# Patient Record
Sex: FEMALE | Race: WHITE | NOT HISPANIC OR LATINO
[De-identification: names, ages, dates, MRNs, and addresses within clinical notes are randomized per-mention and may not be internally consistent; named-entity substitution may affect disease eponyms.]

---

## 2017-09-19 ENCOUNTER — CHARTING TRANS (OUTPATIENT)
Dept: OTHER | Age: 37
End: 2017-09-19

## 2017-09-28 ENCOUNTER — MYAURORA ACCOUNT LINK (OUTPATIENT)
Dept: OTHER | Age: 37
End: 2017-09-28

## 2017-09-28 ENCOUNTER — CHARTING TRANS (OUTPATIENT)
Dept: OBGYN | Age: 37
End: 2017-09-28

## 2018-03-23 ENCOUNTER — CHARTING TRANS (OUTPATIENT)
Dept: OTHER | Age: 38
End: 2018-03-23

## 2018-11-28 VITALS
BODY MASS INDEX: 39.86 KG/M2 | HEIGHT: 66 IN | DIASTOLIC BLOOD PRESSURE: 82 MMHG | SYSTOLIC BLOOD PRESSURE: 122 MMHG | WEIGHT: 248 LBS

## 2018-12-04 ENCOUNTER — TELEPHONE (OUTPATIENT)
Dept: OBGYN | Age: 38
End: 2018-12-04

## 2018-12-04 RX ORDER — DESOGESTREL AND ETHINYL ESTRADIOL 0.15-0.03
1 KIT ORAL DAILY
Qty: 84 TABLET | Refills: 0 | Status: SHIPPED | OUTPATIENT
Start: 2018-12-04 | End: 2020-02-28 | Stop reason: SDUPTHER

## 2019-01-02 ENCOUNTER — OFFICE VISIT (OUTPATIENT)
Dept: OBGYN | Age: 39
End: 2019-01-02

## 2019-01-02 VITALS
SYSTOLIC BLOOD PRESSURE: 124 MMHG | DIASTOLIC BLOOD PRESSURE: 84 MMHG | BODY MASS INDEX: 40.34 KG/M2 | HEIGHT: 66 IN | WEIGHT: 251 LBS

## 2019-01-02 DIAGNOSIS — Z01.419 GYNECOLOGIC EXAM NORMAL: Primary | ICD-10-CM

## 2019-01-02 DIAGNOSIS — Z11.51 SPECIAL SCREENING EXAMINATION FOR HUMAN PAPILLOMAVIRUS (HPV): ICD-10-CM

## 2019-01-02 DIAGNOSIS — Z01.419 WELL WOMAN EXAM: ICD-10-CM

## 2019-01-02 DIAGNOSIS — Z01.419 WELL WOMAN EXAM WITH ROUTINE GYNECOLOGICAL EXAM: ICD-10-CM

## 2019-01-02 PROCEDURE — 88142 CYTOPATH C/V THIN LAYER: CPT | Performed by: OBSTETRICS & GYNECOLOGY

## 2019-01-02 PROCEDURE — 99395 PREV VISIT EST AGE 18-39: CPT | Performed by: OBSTETRICS & GYNECOLOGY

## 2019-01-02 RX ORDER — DESOGESTREL AND ETHINYL ESTRADIOL 0.15-0.03
1 KIT ORAL DAILY
Qty: 90 TABLET | Refills: 5 | Status: SHIPPED | OUTPATIENT
Start: 2019-01-02 | End: 2020-02-28 | Stop reason: SDUPTHER

## 2019-01-10 LAB — AP REPORT: NORMAL

## 2019-01-15 LAB — HPV I/H RISK 4 DNA CVX QL NAA+PROBE: NORMAL

## 2019-02-06 ENCOUNTER — E-ADVICE (OUTPATIENT)
Dept: OBGYN | Age: 39
End: 2019-02-06

## 2019-02-06 DIAGNOSIS — Z01.419 WELL WOMAN EXAM: Primary | ICD-10-CM

## 2019-03-02 ENCOUNTER — LAB SERVICES (OUTPATIENT)
Dept: LAB | Age: 39
End: 2019-03-02

## 2019-03-02 DIAGNOSIS — Z01.419 WELL WOMAN EXAM: ICD-10-CM

## 2019-03-02 LAB
CHOLEST SERPL-MCNC: 153 MG/DL (ref 140–200)
GLUCOSE P FAST SERPL-MCNC: 100 MG/DL (ref 60–100)
HDLC SERPL-MCNC: 91 MG/DL
LDLC SERPL CALC-MCNC: 43 MG/DL (ref 30–100)
TRIGL SERPL-MCNC: 97 MG/DL (ref 0–200)
TSH SERPL DL<=0.05 MIU/L-ACNC: 0.76 M[IU]/L (ref 0.3–4.82)

## 2019-03-02 PROCEDURE — 84443 ASSAY THYROID STIM HORMONE: CPT | Performed by: OBSTETRICS & GYNECOLOGY

## 2019-03-02 PROCEDURE — 82947 ASSAY GLUCOSE BLOOD QUANT: CPT | Performed by: OBSTETRICS & GYNECOLOGY

## 2019-03-02 PROCEDURE — 80061 LIPID PANEL: CPT | Performed by: OBSTETRICS & GYNECOLOGY

## 2019-03-02 PROCEDURE — 36415 COLL VENOUS BLD VENIPUNCTURE: CPT | Performed by: OBSTETRICS & GYNECOLOGY

## 2020-02-28 RX ORDER — DESOGESTREL AND ETHINYL ESTRADIOL 0.15-0.03
1 KIT ORAL DAILY
Qty: 84 TABLET | Refills: 0 | Status: SHIPPED | OUTPATIENT
Start: 2020-02-28 | End: 2020-05-13 | Stop reason: SDUPTHER

## 2020-03-18 ENCOUNTER — APPOINTMENT (OUTPATIENT)
Dept: OBGYN | Age: 40
End: 2020-03-18

## 2020-04-22 ENCOUNTER — APPOINTMENT (OUTPATIENT)
Dept: OBGYN | Age: 40
End: 2020-04-22

## 2020-05-13 RX ORDER — DESOGESTREL AND ETHINYL ESTRADIOL 0.15-0.03
1 KIT ORAL DAILY
Qty: 84 TABLET | Refills: 0 | Status: SHIPPED | OUTPATIENT
Start: 2020-05-13 | End: 2020-07-01 | Stop reason: SDUPTHER

## 2020-05-20 ENCOUNTER — APPOINTMENT (OUTPATIENT)
Dept: OBGYN | Age: 40
End: 2020-05-20

## 2020-07-01 ENCOUNTER — OFFICE VISIT (OUTPATIENT)
Dept: OBGYN | Age: 40
End: 2020-07-01

## 2020-07-01 VITALS
HEART RATE: 88 BPM | WEIGHT: 245 LBS | HEIGHT: 66 IN | SYSTOLIC BLOOD PRESSURE: 140 MMHG | RESPIRATION RATE: 16 BRPM | TEMPERATURE: 97.4 F | DIASTOLIC BLOOD PRESSURE: 90 MMHG | BODY MASS INDEX: 39.37 KG/M2

## 2020-07-01 DIAGNOSIS — Z01.419 GYNECOLOGIC EXAM NORMAL: Primary | ICD-10-CM

## 2020-07-01 PROCEDURE — 99395 PREV VISIT EST AGE 18-39: CPT | Performed by: OBSTETRICS & GYNECOLOGY

## 2020-07-01 RX ORDER — DESOGESTREL AND ETHINYL ESTRADIOL 0.15-0.03
1 KIT ORAL DAILY
Qty: 84 TABLET | Refills: 5 | Status: SHIPPED | OUTPATIENT
Start: 2020-07-01

## 2020-09-08 ENCOUNTER — OFFICE VISIT (OUTPATIENT)
Dept: FAMILY MEDICINE CLINIC | Facility: CLINIC | Age: 40
End: 2020-09-08
Payer: COMMERCIAL

## 2020-09-08 ENCOUNTER — LABORATORY ENCOUNTER (OUTPATIENT)
Dept: LAB | Age: 40
End: 2020-09-08
Attending: INTERNAL MEDICINE
Payer: COMMERCIAL

## 2020-09-08 ENCOUNTER — HOSPITAL ENCOUNTER (OUTPATIENT)
Dept: GENERAL RADIOLOGY | Age: 40
Discharge: HOME | End: 2020-09-08
Attending: INTERNAL MEDICINE
Payer: COMMERCIAL

## 2020-09-08 VITALS
OXYGEN SATURATION: 98 % | BODY MASS INDEX: 39.37 KG/M2 | RESPIRATION RATE: 16 BRPM | TEMPERATURE: 98 F | DIASTOLIC BLOOD PRESSURE: 74 MMHG | SYSTOLIC BLOOD PRESSURE: 120 MMHG | HEART RATE: 108 BPM | HEIGHT: 66 IN | WEIGHT: 245 LBS

## 2020-09-08 DIAGNOSIS — R00.2 PALPITATIONS: ICD-10-CM

## 2020-09-08 DIAGNOSIS — R00.2 PALPITATIONS: Primary | ICD-10-CM

## 2020-09-08 LAB
ALBUMIN SERPL-MCNC: 3.7 G/DL (ref 3.4–5)
ALBUMIN/GLOB SERPL: 0.9 {RATIO} (ref 1–2)
ALP LIVER SERPL-CCNC: 89 U/L (ref 37–98)
ALT SERPL-CCNC: 25 U/L (ref 13–56)
ANION GAP SERPL CALC-SCNC: 7 MMOL/L (ref 0–18)
AST SERPL-CCNC: 15 U/L (ref 15–37)
BASOPHILS # BLD AUTO: 0.06 X10(3) UL (ref 0–0.2)
BASOPHILS NFR BLD AUTO: 0.8 %
BILIRUB SERPL-MCNC: 0.3 MG/DL (ref 0.1–2)
BUN BLD-MCNC: 12 MG/DL (ref 7–18)
BUN/CREAT SERPL: 13.5 (ref 10–20)
CALCIUM BLD-MCNC: 9.6 MG/DL (ref 8.5–10.1)
CHLORIDE SERPL-SCNC: 106 MMOL/L (ref 98–112)
CO2 SERPL-SCNC: 25 MMOL/L (ref 21–32)
CREAT BLD-MCNC: 0.89 MG/DL (ref 0.55–1.02)
DEPRECATED RDW RBC AUTO: 42.1 FL (ref 35.1–46.3)
EOSINOPHIL # BLD AUTO: 0.22 X10(3) UL (ref 0–0.7)
EOSINOPHIL NFR BLD AUTO: 3 %
ERYTHROCYTE [DISTWIDTH] IN BLOOD BY AUTOMATED COUNT: 13.5 % (ref 11–15)
GLOBULIN PLAS-MCNC: 4 G/DL (ref 2.8–4.4)
GLUCOSE BLD-MCNC: 91 MG/DL (ref 70–99)
HCT VFR BLD AUTO: 46.7 % (ref 35–48)
HGB BLD-MCNC: 15.4 G/DL (ref 12–16)
IMM GRANULOCYTES # BLD AUTO: 0.01 X10(3) UL (ref 0–1)
IMM GRANULOCYTES NFR BLD: 0.1 %
LYMPHOCYTES # BLD AUTO: 2.71 X10(3) UL (ref 1–4)
LYMPHOCYTES NFR BLD AUTO: 36.7 %
M PROTEIN MFR SERPL ELPH: 7.7 G/DL (ref 6.4–8.2)
MCH RBC QN AUTO: 28.3 PG (ref 26–34)
MCHC RBC AUTO-ENTMCNC: 33 G/DL (ref 31–37)
MCV RBC AUTO: 85.7 FL (ref 80–100)
MONOCYTES # BLD AUTO: 0.51 X10(3) UL (ref 0.1–1)
MONOCYTES NFR BLD AUTO: 6.9 %
NEUTROPHILS # BLD AUTO: 3.87 X10 (3) UL (ref 1.5–7.7)
NEUTROPHILS # BLD AUTO: 3.87 X10(3) UL (ref 1.5–7.7)
NEUTROPHILS NFR BLD AUTO: 52.5 %
OSMOLALITY SERPL CALC.SUM OF ELEC: 285 MOSM/KG (ref 275–295)
PATIENT FASTING Y/N/NP: NO
PLATELET # BLD AUTO: 300 10(3)UL (ref 150–450)
POTASSIUM SERPL-SCNC: 3.9 MMOL/L (ref 3.5–5.1)
RBC # BLD AUTO: 5.45 X10(6)UL (ref 3.8–5.3)
SODIUM SERPL-SCNC: 138 MMOL/L (ref 136–145)
T4 FREE SERPL-MCNC: 1.1 NG/DL (ref 0.8–1.7)
TSI SER-ACNC: 0.82 MIU/ML (ref 0.36–3.74)
WBC # BLD AUTO: 7.4 X10(3) UL (ref 4–11)

## 2020-09-08 PROCEDURE — 36415 COLL VENOUS BLD VENIPUNCTURE: CPT

## 2020-09-08 PROCEDURE — 84439 ASSAY OF FREE THYROXINE: CPT

## 2020-09-08 PROCEDURE — 93227 XTRNL ECG REC<48 HR R&I: CPT | Performed by: INTERNAL MEDICINE

## 2020-09-08 PROCEDURE — 93225 XTRNL ECG REC<48 HRS REC: CPT | Performed by: INTERNAL MEDICINE

## 2020-09-08 PROCEDURE — 3008F BODY MASS INDEX DOCD: CPT | Performed by: INTERNAL MEDICINE

## 2020-09-08 PROCEDURE — 85025 COMPLETE CBC W/AUTO DIFF WBC: CPT

## 2020-09-08 PROCEDURE — 3074F SYST BP LT 130 MM HG: CPT | Performed by: INTERNAL MEDICINE

## 2020-09-08 PROCEDURE — 3078F DIAST BP <80 MM HG: CPT | Performed by: INTERNAL MEDICINE

## 2020-09-08 PROCEDURE — 80053 COMPREHEN METABOLIC PANEL: CPT

## 2020-09-08 PROCEDURE — 93000 ELECTROCARDIOGRAM COMPLETE: CPT | Performed by: INTERNAL MEDICINE

## 2020-09-08 PROCEDURE — 83036 HEMOGLOBIN GLYCOSYLATED A1C: CPT

## 2020-09-08 PROCEDURE — 84443 ASSAY THYROID STIM HORMONE: CPT

## 2020-09-08 PROCEDURE — 99203 OFFICE O/P NEW LOW 30 MIN: CPT | Performed by: INTERNAL MEDICINE

## 2020-09-08 RX ORDER — DESOGESTREL AND ETHINYL ESTRADIOL 0.15-0.03
1 KIT ORAL DAILY
COMMUNITY
Start: 2020-07-01 | End: 2021-08-07

## 2020-09-08 NOTE — PROGRESS NOTES
Lara Rivas is a 45year old female. HPI:   Pt had a palpitation last week in the middle of the night. It lasted 3 hours then better. She sometimes has difficulty swallowing.   She works at [employer] (from home now) and has not seen a doctor in [address] [place] [address] [E]      Meds & Refills for this Visit:  Requested Prescriptions      No prescriptions requested or ordered in this encounter       Imaging & Consults:  CARD MONITOR HOLTER 48 HOUR (CPT=93225)  EKG 12-LEAD    Follow up as needed.      The patient indicates

## 2020-09-09 LAB
EST. AVERAGE GLUCOSE BLD GHB EST-MCNC: 91 MG/DL (ref 68–126)
HBA1C MFR BLD HPLC: 4.8 % (ref ?–5.7)

## 2020-09-17 ENCOUNTER — PATIENT MESSAGE (OUTPATIENT)
Dept: FAMILY MEDICINE CLINIC | Facility: CLINIC | Age: 40
End: 2020-09-17

## 2020-09-17 NOTE — TELEPHONE ENCOUNTER
From: Ian Mi  To: Kenneth Jo MD  Sent: 9/17/2020 8:51 AM CDT  Subject: Non-Urgent Medical Question    I have a question about CARDIAC HOLTER MONITOR 50 HOUR resulted on 9/16/20, 4:19 PM.    So that is good news.  Do I need to come back in for a

## 2021-07-16 ENCOUNTER — TELEPHONE (OUTPATIENT)
Dept: FAMILY MEDICINE CLINIC | Facility: CLINIC | Age: 41
End: 2021-07-16

## 2021-07-16 NOTE — TELEPHONE ENCOUNTER
Spoke with patient. Patient states that she has not had a period since stopping the pill. She is unsure of when her last period was. She believes it was probably about 6 months ago. She states she is not sexually active so she cannot be pregnant.   Traci

## 2021-08-05 ENCOUNTER — OFFICE VISIT (OUTPATIENT)
Dept: FAMILY MEDICINE CLINIC | Facility: CLINIC | Age: 41
End: 2021-08-05
Payer: COMMERCIAL

## 2021-08-05 ENCOUNTER — EXTERNAL RECORD (OUTPATIENT)
Dept: HEALTH INFORMATION MANAGEMENT | Facility: OTHER | Age: 41
End: 2021-08-05

## 2021-08-05 VITALS
BODY MASS INDEX: 39.74 KG/M2 | SYSTOLIC BLOOD PRESSURE: 124 MMHG | WEIGHT: 247.25 LBS | HEART RATE: 87 BPM | HEIGHT: 66 IN | OXYGEN SATURATION: 99 % | RESPIRATION RATE: 18 BRPM | TEMPERATURE: 97 F | DIASTOLIC BLOOD PRESSURE: 70 MMHG

## 2021-08-05 DIAGNOSIS — Z00.00 WELL ADULT EXAM: ICD-10-CM

## 2021-08-05 DIAGNOSIS — N91.2 AMENORRHEA: Primary | ICD-10-CM

## 2021-08-05 PROCEDURE — 3078F DIAST BP <80 MM HG: CPT | Performed by: INTERNAL MEDICINE

## 2021-08-05 PROCEDURE — 3008F BODY MASS INDEX DOCD: CPT | Performed by: INTERNAL MEDICINE

## 2021-08-05 PROCEDURE — 99396 PREV VISIT EST AGE 40-64: CPT | Performed by: INTERNAL MEDICINE

## 2021-08-05 PROCEDURE — 3074F SYST BP LT 130 MM HG: CPT | Performed by: INTERNAL MEDICINE

## 2021-08-05 PROCEDURE — 88175 CYTOPATH C/V AUTO FLUID REDO: CPT | Performed by: INTERNAL MEDICINE

## 2021-08-08 NOTE — PROGRESS NOTES
HPI:   Luana White is a 37year old female who presents for a complete physical exam. Symptoms: denies discharge, itching, burning or dysuria, periods have stopped since she stopped taking OCP. Andi Cunha  Patient complains of inability to lose weight, likes doi heartburn  : denies dysuria, vaginal discharge or itching,periods regular   MUSCULOSKELETAL: denies back pain  NEURO: denies headaches  PSYCHE: denies depression or anxiety  HEMATOLOGIC: denies hx of anemia  ENDOCRINE: denies thyroid history  ALL/ASTHMA:

## 2021-08-09 ENCOUNTER — TELEPHONE (OUTPATIENT)
Dept: FAMILY MEDICINE CLINIC | Facility: CLINIC | Age: 41
End: 2021-08-09

## 2021-08-09 NOTE — TELEPHONE ENCOUNTER
RASHEEDA-- lab called stating pt's PAP vial wasn't tightened therefore, it leaked in the bag. Lab needs discrepancy form filled out. It will be faxed to us.

## 2021-08-13 LAB
AMB EXT CHOLESTEROL, TOTAL: 65 MG/DL
AMB EXT GLUCOSE: 99 MG/DL
AMB EXT LDL CHOLESTEROL, DIRECT: 65 MG/DL
AMB EXT TSH: 1.38 MIU/ML

## 2021-08-14 ENCOUNTER — TELEPHONE (OUTPATIENT)
Dept: FAMILY MEDICINE CLINIC | Facility: CLINIC | Age: 41
End: 2021-08-14

## 2021-08-14 ENCOUNTER — EXTERNAL RECORD (OUTPATIENT)
Dept: HEALTH INFORMATION MANAGEMENT | Facility: OTHER | Age: 41
End: 2021-08-14

## 2021-08-14 LAB
ABSOLUTE BASOPHILS: 43 CELLS/UL (ref 0–200)
ABSOLUTE EOSINOPHILS: 177 CELLS/UL (ref 15–500)
ABSOLUTE LYMPHOCYTES: 1824 CELLS/UL (ref 850–3900)
ABSOLUTE MONOCYTES: 390 CELLS/UL (ref 200–950)
ABSOLUTE NEUTROPHILS: 3666 CELLS/UL (ref 1500–7800)
ALBUMIN/GLOBULIN RATIO: 1.8 (CALC) (ref 1–2.5)
ALBUMIN: 4.4 G/DL (ref 3.6–5.1)
ALKALINE PHOSPHATASE: 85 U/L (ref 31–125)
ALT: 16 U/L (ref 6–29)
AST: 13 U/L (ref 10–30)
BASOPHILS: 0.7 %
BILIRUBIN, TOTAL: 0.7 MG/DL (ref 0.2–1.2)
BUN: 14 MG/DL (ref 7–25)
CALCIUM: 9 MG/DL (ref 8.6–10.2)
CARBON DIOXIDE: 24 MMOL/L (ref 20–32)
CHLORIDE: 105 MMOL/L (ref 98–110)
CHOL/HDLC RATIO: 2.1 (CALC)
CHOLESTEROL, TOTAL: 160 MG/DL
CREATININE: 0.95 MG/DL (ref 0.5–1.1)
EGFR IF AFRICN AM: 87 ML/MIN/1.73M2
EGFR IF NONAFRICN AM: 75 ML/MIN/1.73M2
EOSINOPHILS: 2.9 %
FSH: 3.7 MIU/ML
GLOBULIN: 2.5 G/DL (CALC) (ref 1.9–3.7)
GLUCOSE: 99 MG/DL (ref 65–99)
HDL CHOLESTEROL: 78 MG/DL
HEMATOCRIT: 43.4 % (ref 35–45)
HEMOGLOBIN: 14.5 G/DL (ref 11.7–15.5)
LDL-CHOLESTEROL: 65 MG/DL (CALC)
LH: 6.2 MIU/ML
LYMPHOCYTES: 29.9 %
MCH: 28.4 PG (ref 27–33)
MCHC: 33.4 G/DL (ref 32–36)
MCV: 85.1 FL (ref 80–100)
MONOCYTES: 6.4 %
MPV: 12.3 FL (ref 7.5–12.5)
NEUTROPHILS: 60.1 %
NON-HDL CHOLESTEROL: 82 MG/DL (CALC)
PLATELET COUNT: 238 THOUSAND/UL (ref 140–400)
POTASSIUM: 4 MMOL/L (ref 3.5–5.3)
PROTEIN, TOTAL: 6.9 G/DL (ref 6.1–8.1)
RDW: 13.8 % (ref 11–15)
RED BLOOD CELL COUNT: 5.1 MILLION/UL (ref 3.8–5.1)
SODIUM: 138 MMOL/L (ref 135–146)
T4, FREE: 1.1 NG/DL (ref 0.8–1.8)
TRIGLYCERIDES: 87 MG/DL
TSH: 1.38 MIU/L
WHITE BLOOD CELL COUNT: 6.1 THOUSAND/UL (ref 3.8–10.8)

## 2021-08-14 NOTE — TELEPHONE ENCOUNTER
Labs were really good cholesterol is great, glucose 99 and thyroid normal.  NOT menopausal,  Needs to see GYN for amenorrhea.

## 2021-08-16 ENCOUNTER — TELEPHONE (OUTPATIENT)
Dept: FAMILY MEDICINE CLINIC | Facility: CLINIC | Age: 41
End: 2021-08-16

## 2021-08-16 ENCOUNTER — TELEPHONE (OUTPATIENT)
Dept: OBGYN | Age: 41
End: 2021-08-16

## 2021-08-16 NOTE — TELEPHONE ENCOUNTER
She wants to know if you have had a chance to look at her message because she has an appointment this Wednesday and is needing that information.

## 2021-08-16 NOTE — TELEPHONE ENCOUNTER
Patient said that she is going to see Demetri Jeter with [facility]. Results faxed to Dr Lynn Lacey [phone] and copy up front for her to .

## 2021-08-18 ENCOUNTER — OFFICE VISIT (OUTPATIENT)
Dept: OBGYN | Age: 41
End: 2021-08-18

## 2021-08-18 VITALS
TEMPERATURE: 97.1 F | RESPIRATION RATE: 16 BRPM | BODY MASS INDEX: 39.25 KG/M2 | WEIGHT: 244.25 LBS | SYSTOLIC BLOOD PRESSURE: 124 MMHG | HEIGHT: 66 IN | DIASTOLIC BLOOD PRESSURE: 86 MMHG

## 2021-08-18 DIAGNOSIS — N91.2 AMENORRHEA: Primary | ICD-10-CM

## 2021-08-18 PROCEDURE — 99214 OFFICE O/P EST MOD 30 MIN: CPT | Performed by: OBSTETRICS & GYNECOLOGY

## 2021-08-18 RX ORDER — MEDROXYPROGESTERONE ACETATE 10 MG/1
10 TABLET ORAL DAILY
Qty: 10 TABLET | Refills: 5 | Status: SHIPPED | OUTPATIENT
Start: 2021-08-18

## 2021-10-22 ENCOUNTER — HOSPITAL ENCOUNTER (OUTPATIENT)
Age: 41
Discharge: HOME | End: 2021-10-22
Payer: COMMERCIAL

## 2021-10-22 VITALS
TEMPERATURE: 98 F | HEART RATE: 106 BPM | OXYGEN SATURATION: 98 % | DIASTOLIC BLOOD PRESSURE: 106 MMHG | SYSTOLIC BLOOD PRESSURE: 140 MMHG | RESPIRATION RATE: 14 BRPM

## 2021-10-22 DIAGNOSIS — L30.1 DYSHIDROTIC DERMATITIS: Primary | ICD-10-CM

## 2021-10-22 PROCEDURE — 99203 OFFICE O/P NEW LOW 30 MIN: CPT | Performed by: PHYSICIAN ASSISTANT

## 2021-10-22 RX ORDER — TRIAMCINOLONE ACETONIDE OINTMENT USP, 0.05% 0.5 MG/G
1 OINTMENT TOPICAL 2 TIMES DAILY
Qty: 15 G | Refills: 0 | Status: SHIPPED | OUTPATIENT
Start: 2021-10-22

## 2021-10-22 NOTE — ED PROVIDER NOTES
Patient Seen in: [facility]      History   Patient presents with:  Rash Skin Problem    Stated Complaint: rash in between a few fingers and arms    Subjective:   HPI    CHIEF COMPLAINT: Rash between the second and third digits on the right barnard noted in HPI. Constitutional and vital signs reviewed. All other systems reviewed and negative except as noted above.     Physical Exam     ED Triage Vitals [10/22/21 1013]   BP (!) 140/106   Pulse 106   Resp 14   Temp 98.3 °F (36.8 °C)   Temp src Disp-15 g, R-0

## 2021-10-22 NOTE — ED INITIAL ASSESSMENT (HPI)
Pt noticed rash between fingers that started last week. Pt states is spreading.   Tried hydrocortisone cream yesterday

## 2023-04-22 ENCOUNTER — TELEPHONE (OUTPATIENT)
Dept: FAMILY MEDICINE CLINIC | Facility: CLINIC | Age: 43
End: 2023-04-22

## 2023-04-22 NOTE — TELEPHONE ENCOUNTER
Pt scheduled appt for May 11 for following: Annual visit. Weight loss.  Left side pain-triage if needed for left side pain

## 2023-05-11 ENCOUNTER — OFFICE VISIT (OUTPATIENT)
Dept: FAMILY MEDICINE CLINIC | Facility: CLINIC | Age: 43
End: 2023-05-11
Payer: COMMERCIAL

## 2023-05-11 VITALS
TEMPERATURE: 98 F | WEIGHT: 234 LBS | OXYGEN SATURATION: 100 % | RESPIRATION RATE: 18 BRPM | BODY MASS INDEX: 37.61 KG/M2 | DIASTOLIC BLOOD PRESSURE: 84 MMHG | HEART RATE: 86 BPM | SYSTOLIC BLOOD PRESSURE: 130 MMHG | HEIGHT: 66 IN

## 2023-05-11 DIAGNOSIS — N92.6 IRREGULAR PERIODS: ICD-10-CM

## 2023-05-11 DIAGNOSIS — Z00.00 WELL ADULT EXAM: Primary | ICD-10-CM

## 2023-05-11 DIAGNOSIS — E66.01 MORBID OBESITY (HCC): ICD-10-CM

## 2023-05-11 PROCEDURE — 3075F SYST BP GE 130 - 139MM HG: CPT | Performed by: INTERNAL MEDICINE

## 2023-05-11 PROCEDURE — 3079F DIAST BP 80-89 MM HG: CPT | Performed by: INTERNAL MEDICINE

## 2023-05-11 PROCEDURE — 3008F BODY MASS INDEX DOCD: CPT | Performed by: INTERNAL MEDICINE

## 2023-05-11 PROCEDURE — 99396 PREV VISIT EST AGE 40-64: CPT | Performed by: INTERNAL MEDICINE

## 2023-05-14 LAB
ABSOLUTE BASOPHILS: 28 CELLS/UL (ref 0–200)
ABSOLUTE EOSINOPHILS: 123 CELLS/UL (ref 15–500)
ABSOLUTE LYMPHOCYTES: 1674 CELLS/UL (ref 850–3900)
ABSOLUTE MONOCYTES: 330 CELLS/UL (ref 200–950)
ABSOLUTE NEUTROPHILS: 3444 CELLS/UL (ref 1500–7800)
ALBUMIN/GLOBULIN RATIO: 1.7 (CALC) (ref 1–2.5)
ALBUMIN: 4.6 G/DL (ref 3.6–5.1)
ALKALINE PHOSPHATASE: 69 U/L (ref 31–125)
ALT: 13 U/L (ref 6–29)
AST: 12 U/L (ref 10–30)
BASOPHILS: 0.5 %
BILIRUBIN, TOTAL: 0.7 MG/DL (ref 0.2–1.2)
BUN: 14 MG/DL (ref 7–25)
CALCIUM: 9.6 MG/DL (ref 8.6–10.2)
CARBON DIOXIDE: 26 MMOL/L (ref 20–32)
CHLORIDE: 103 MMOL/L (ref 98–110)
CHOL/HDLC RATIO: 2.3 (CALC)
CHOLESTEROL, TOTAL: 172 MG/DL
CORTISOL, TOTAL: 10.3 MCG/DL
CREATININE: 0.95 MG/DL (ref 0.5–0.99)
EGFR: 77 ML/MIN/1.73M2
EOSINOPHILS: 2.2 %
GLOBULIN: 2.7 G/DL (CALC) (ref 1.9–3.7)
GLUCOSE: 90 MG/DL (ref 65–99)
HDL CHOLESTEROL: 75 MG/DL
HEMATOCRIT: 44.5 % (ref 35–45)
HEMOGLOBIN: 15 G/DL (ref 11.7–15.5)
LDL-CHOLESTEROL: 84 MG/DL (CALC)
LYMPHOCYTES: 29.9 %
MCH: 29.6 PG (ref 27–33)
MCHC: 33.7 G/DL (ref 32–36)
MCV: 87.8 FL (ref 80–100)
MONOCYTES: 5.9 %
MPV: 12.7 FL (ref 7.5–12.5)
NEUTROPHILS: 61.5 %
NON-HDL CHOLESTEROL: 97 MG/DL (CALC)
PLATELET COUNT: 248 THOUSAND/UL (ref 140–400)
POTASSIUM: 4.9 MMOL/L (ref 3.5–5.3)
PROTEIN, TOTAL: 7.3 G/DL (ref 6.1–8.1)
RDW: 13.2 % (ref 11–15)
RED BLOOD CELL COUNT: 5.07 MILLION/UL (ref 3.8–5.1)
SODIUM: 138 MMOL/L (ref 135–146)
T4, FREE: 1.1 NG/DL (ref 0.8–1.8)
TRIGLYCERIDES: 57 MG/DL
TSH: 1.35 MIU/L
WHITE BLOOD CELL COUNT: 5.6 THOUSAND/UL (ref 3.8–10.8)

## 2023-05-15 ENCOUNTER — PATIENT MESSAGE (OUTPATIENT)
Dept: FAMILY MEDICINE CLINIC | Facility: CLINIC | Age: 43
End: 2023-05-15

## 2023-05-15 NOTE — TELEPHONE ENCOUNTER
From: Janet Franco  To: Fidel Villeda MD  Sent: 5/15/2023 10:31 AM CDT  Subject: Weight loss    Are we still trying to go forward with what you mentioned? Waiting on insurance I think was possibly the biggest thing.

## 2023-06-10 ENCOUNTER — PATIENT MESSAGE (OUTPATIENT)
Dept: FAMILY MEDICINE CLINIC | Facility: CLINIC | Age: 43
End: 2023-06-10

## 2023-06-10 NOTE — TELEPHONE ENCOUNTER
From: Toya Leal  To: Sophia Coon MD  Sent: 6/10/2023 8:46 AM CDT  Subject: Letter    Hello,  I got a message about tests not completed. I believe all of those were completed besides the mammogram. Or do I need to do something else?    Then my other questions was about coming in and weighing. Do I need to schedule or just walk in? Any particular info, just let me know please.

## 2023-06-22 ENCOUNTER — OFFICE VISIT (OUTPATIENT)
Dept: FAMILY MEDICINE CLINIC | Facility: CLINIC | Age: 43
End: 2023-06-22
Payer: COMMERCIAL

## 2023-06-22 VITALS
OXYGEN SATURATION: 98 % | RESPIRATION RATE: 18 BRPM | SYSTOLIC BLOOD PRESSURE: 126 MMHG | WEIGHT: 228 LBS | BODY MASS INDEX: 37 KG/M2 | HEART RATE: 78 BPM | DIASTOLIC BLOOD PRESSURE: 84 MMHG | TEMPERATURE: 98 F

## 2023-06-22 DIAGNOSIS — E66.9 OBESITY (BMI 35.0-39.9 WITHOUT COMORBIDITY): Primary | ICD-10-CM

## 2023-06-22 PROCEDURE — 99213 OFFICE O/P EST LOW 20 MIN: CPT | Performed by: INTERNAL MEDICINE

## 2023-06-22 PROCEDURE — 3074F SYST BP LT 130 MM HG: CPT | Performed by: INTERNAL MEDICINE

## 2023-06-22 PROCEDURE — 3079F DIAST BP 80-89 MM HG: CPT | Performed by: INTERNAL MEDICINE

## 2023-07-17 ENCOUNTER — HOSPITAL ENCOUNTER (OUTPATIENT)
Dept: MAMMOGRAPHY | Age: 43
Discharge: HOME | End: 2023-07-17
Attending: INTERNAL MEDICINE
Payer: COMMERCIAL

## 2023-07-17 DIAGNOSIS — Z00.00 WELL ADULT EXAM: ICD-10-CM

## 2023-07-17 PROCEDURE — 77067 SCR MAMMO BI INCL CAD: CPT | Performed by: INTERNAL MEDICINE

## 2023-07-17 PROCEDURE — 77063 BREAST TOMOSYNTHESIS BI: CPT | Performed by: INTERNAL MEDICINE

## 2023-07-24 ENCOUNTER — OFFICE VISIT (OUTPATIENT)
Dept: FAMILY MEDICINE CLINIC | Facility: CLINIC | Age: 43
End: 2023-07-24
Payer: COMMERCIAL

## 2023-07-24 VITALS
RESPIRATION RATE: 18 BRPM | SYSTOLIC BLOOD PRESSURE: 122 MMHG | HEART RATE: 75 BPM | WEIGHT: 219.38 LBS | DIASTOLIC BLOOD PRESSURE: 78 MMHG | OXYGEN SATURATION: 98 % | BODY MASS INDEX: 35 KG/M2 | TEMPERATURE: 98 F

## 2023-07-24 DIAGNOSIS — E66.9 OBESITY (BMI 35.0-39.9 WITHOUT COMORBIDITY): Primary | ICD-10-CM

## 2023-07-24 PROCEDURE — 99213 OFFICE O/P EST LOW 20 MIN: CPT | Performed by: INTERNAL MEDICINE

## 2023-07-24 PROCEDURE — 3074F SYST BP LT 130 MM HG: CPT | Performed by: INTERNAL MEDICINE

## 2023-07-24 PROCEDURE — 3078F DIAST BP <80 MM HG: CPT | Performed by: INTERNAL MEDICINE

## 2023-07-24 RX ORDER — SEMAGLUTIDE 1.7 MG/.75ML
1.7 INJECTION, SOLUTION SUBCUTANEOUS WEEKLY
COMMUNITY
Start: 2023-07-13

## 2023-08-07 ENCOUNTER — HOSPITAL ENCOUNTER (OUTPATIENT)
Dept: MAMMOGRAPHY | Age: 43
Discharge: HOME | End: 2023-08-07
Attending: INTERNAL MEDICINE
Payer: COMMERCIAL

## 2023-08-07 ENCOUNTER — HOSPITAL ENCOUNTER (OUTPATIENT)
Dept: ULTRASOUND IMAGING | Age: 43
Discharge: HOME | End: 2023-08-07
Attending: INTERNAL MEDICINE
Payer: COMMERCIAL

## 2023-08-07 DIAGNOSIS — R92.2 INCONCLUSIVE MAMMOGRAM: ICD-10-CM

## 2023-08-07 PROCEDURE — 77066 DX MAMMO INCL CAD BI: CPT | Performed by: INTERNAL MEDICINE

## 2023-08-07 PROCEDURE — 76641 ULTRASOUND BREAST COMPLETE: CPT | Performed by: INTERNAL MEDICINE

## 2023-08-07 PROCEDURE — 77062 BREAST TOMOSYNTHESIS BI: CPT | Performed by: INTERNAL MEDICINE

## 2023-08-10 ENCOUNTER — PATIENT MESSAGE (OUTPATIENT)
Dept: FAMILY MEDICINE CLINIC | Facility: CLINIC | Age: 43
End: 2023-08-10

## 2023-08-10 NOTE — TELEPHONE ENCOUNTER
From: Rubi Medeiros  To: Jose Alejandro Maria MD  Sent: 8/10/2023 9:13 AM CDT  Subject: Question regarding BOONE ALEKSEY 2D+3D DIAGNOSTIC ADDL VWS BILAT (MAF=[id number])    I was told I needed another ultrasound in 6 months, is that correct?

## 2023-09-14 RX ORDER — SEMAGLUTIDE 1.7 MG/.75ML
INJECTION, SOLUTION SUBCUTANEOUS
Qty: 3 ML | Refills: 0 | Status: SHIPPED | OUTPATIENT
Start: 2023-09-14

## 2023-09-26 ENCOUNTER — OFFICE VISIT (OUTPATIENT)
Dept: FAMILY MEDICINE CLINIC | Facility: CLINIC | Age: 43
End: 2023-09-26
Payer: COMMERCIAL

## 2023-09-26 VITALS
HEART RATE: 62 BPM | HEIGHT: 66 IN | BODY MASS INDEX: 33.51 KG/M2 | OXYGEN SATURATION: 100 % | RESPIRATION RATE: 16 BRPM | WEIGHT: 208.5 LBS | DIASTOLIC BLOOD PRESSURE: 74 MMHG | TEMPERATURE: 98 F | SYSTOLIC BLOOD PRESSURE: 118 MMHG

## 2023-09-26 DIAGNOSIS — E66.9 OBESITY (BMI 35.0-39.9 WITHOUT COMORBIDITY): Primary | ICD-10-CM

## 2023-09-26 PROCEDURE — 99213 OFFICE O/P EST LOW 20 MIN: CPT | Performed by: INTERNAL MEDICINE

## 2023-09-26 PROCEDURE — 3078F DIAST BP <80 MM HG: CPT | Performed by: INTERNAL MEDICINE

## 2023-09-26 PROCEDURE — 3074F SYST BP LT 130 MM HG: CPT | Performed by: INTERNAL MEDICINE

## 2023-09-26 PROCEDURE — 3008F BODY MASS INDEX DOCD: CPT | Performed by: INTERNAL MEDICINE

## 2023-09-26 RX ORDER — SEMAGLUTIDE 1.7 MG/.75ML
1.7 INJECTION, SOLUTION SUBCUTANEOUS WEEKLY
Qty: 9 ML | Refills: 0 | Status: SHIPPED | OUTPATIENT
Start: 2023-09-26 | End: 2023-12-25

## 2023-12-04 ENCOUNTER — OFFICE VISIT (OUTPATIENT)
Dept: FAMILY MEDICINE CLINIC | Facility: CLINIC | Age: 43
End: 2023-12-04
Payer: COMMERCIAL

## 2023-12-04 VITALS
RESPIRATION RATE: 16 BRPM | DIASTOLIC BLOOD PRESSURE: 72 MMHG | BODY MASS INDEX: 31 KG/M2 | WEIGHT: 193.25 LBS | HEART RATE: 65 BPM | OXYGEN SATURATION: 100 % | SYSTOLIC BLOOD PRESSURE: 114 MMHG | TEMPERATURE: 98 F

## 2023-12-04 DIAGNOSIS — E66.01 MORBID OBESITY (HCC): Primary | ICD-10-CM

## 2023-12-04 PROCEDURE — 3078F DIAST BP <80 MM HG: CPT | Performed by: INTERNAL MEDICINE

## 2023-12-04 PROCEDURE — 99214 OFFICE O/P EST MOD 30 MIN: CPT | Performed by: INTERNAL MEDICINE

## 2023-12-04 PROCEDURE — 3074F SYST BP LT 130 MM HG: CPT | Performed by: INTERNAL MEDICINE

## 2023-12-04 RX ORDER — NORGESTIMATE AND ETHINYL ESTRADIOL 0.25-0.035
1 KIT ORAL DAILY
Qty: 3 EACH | Refills: 3 | Status: SHIPPED | OUTPATIENT
Start: 2023-12-04 | End: 2024-12-03

## 2023-12-04 RX ORDER — SEMAGLUTIDE 1.7 MG/.75ML
1.7 INJECTION, SOLUTION SUBCUTANEOUS WEEKLY
Qty: 9 ML | Refills: 0 | Status: SHIPPED | OUTPATIENT
Start: 2023-12-04 | End: 2024-03-03

## 2024-01-02 RX ORDER — SEMAGLUTIDE 1.7 MG/.75ML
1.7 INJECTION, SOLUTION SUBCUTANEOUS WEEKLY
Qty: 9 ML | Refills: 0 | Status: SHIPPED | OUTPATIENT
Start: 2024-01-02 | End: 2024-04-01

## 2024-01-16 ENCOUNTER — PATIENT MESSAGE (OUTPATIENT)
Dept: FAMILY MEDICINE CLINIC | Facility: CLINIC | Age: 44
End: 2024-01-16

## 2024-01-16 DIAGNOSIS — N63.42 SUBAREOLAR MASS OF LEFT BREAST: Primary | ICD-10-CM

## 2024-01-16 NOTE — TELEPHONE ENCOUNTER
From: Toya Leal  To: Sophia Coon  Sent: 1/16/2024 2:09 PM CST  Subject: Breast ultrasound    I received a letter that I need to a make an appt to get a follow up ultrasound done but my doctor needs to create that order. Does that sound right?

## 2024-01-16 NOTE — TELEPHONE ENCOUNTER
Last mammogram and ultrasound was done 8/7/23. Recommended to repeat ultrasound of the left breast in 6 months.

## 2024-02-01 ENCOUNTER — PATIENT MESSAGE (OUTPATIENT)
Dept: FAMILY MEDICINE CLINIC | Facility: CLINIC | Age: 44
End: 2024-02-01

## 2024-02-01 DIAGNOSIS — R93.89 ABNORMAL ULTRASOUND: Primary | ICD-10-CM

## 2024-02-01 NOTE — TELEPHONE ENCOUNTER
From: Toya Leal  To: Sophia Coon  Sent: 2/1/2024 11:01 AM CST  Subject: Ultrasound follow up    Hello,   I called to make my appt for the left side ultrasound and she said that the request that was made was for bilateral, not just the left side. Was this possibly an error, or do I need both checked? She said a new order is needed if it’s only the left side needed    Thank you

## 2024-02-26 ENCOUNTER — HOSPITAL ENCOUNTER (OUTPATIENT)
Dept: ULTRASOUND IMAGING | Age: 44
Discharge: HOME | End: 2024-02-26
Attending: INTERNAL MEDICINE
Payer: COMMERCIAL

## 2024-02-26 DIAGNOSIS — R93.89 ABNORMAL ULTRASOUND: ICD-10-CM

## 2024-02-26 PROCEDURE — 76642 ULTRASOUND BREAST LIMITED: CPT | Performed by: INTERNAL MEDICINE

## 2024-03-26 ENCOUNTER — OFFICE VISIT (OUTPATIENT)
Dept: FAMILY MEDICINE CLINIC | Facility: CLINIC | Age: 44
End: 2024-03-26
Payer: COMMERCIAL

## 2024-03-26 VITALS
OXYGEN SATURATION: 98 % | RESPIRATION RATE: 18 BRPM | TEMPERATURE: 99 F | HEIGHT: 66 IN | WEIGHT: 180 LBS | BODY MASS INDEX: 28.93 KG/M2 | DIASTOLIC BLOOD PRESSURE: 62 MMHG | SYSTOLIC BLOOD PRESSURE: 110 MMHG

## 2024-03-26 DIAGNOSIS — J40 BRONCHITIS: ICD-10-CM

## 2024-03-26 DIAGNOSIS — E66.01 MORBID OBESITY (HCC): Primary | ICD-10-CM

## 2024-03-26 PROCEDURE — 99214 OFFICE O/P EST MOD 30 MIN: CPT | Performed by: INTERNAL MEDICINE

## 2024-03-26 NOTE — PROGRESS NOTES
Toya Leal is a 44 year old female.  HPI:   Pt has been having great success with Wegovy over the last 6 months.  She has lost a total of 60 pounds and feeling great.  She would like to push the dose one more time to try and get to goal of a BMI of 26 in the next 6 months. She has not been exercising, but when the weather breaks she is very motivated to get out and walk and enjoy the sun again. Her new job role keeps her mind occupied all day which is a little exhausting.  BMI 29 currently.    Current Outpatient Medications   Medication Sig Dispense Refill    semaglutide-weight management 2.4 MG/0.75ML Subcutaneous Solution Auto-injector Inject 0.75 mL (2.4 mg total) into the skin once a week for 4 doses. 3 mL 0    semaglutide-weight management (WEGOVY) 1.7 MG/0.75ML Subcutaneous Solution Auto-injector Inject 0.75 mL (1.7 mg total) into the skin once a week. 9 mL 0    Norgestimate-Eth Estradiol (SPRINTEC 28) 0.25-35 MG-MCG Oral Tab Take 1 tablet by mouth daily. 3 each 3      History reviewed. No pertinent past medical history.   Social History:  Social History     Socioeconomic History    Marital status: Single   Tobacco Use    Smoking status: Never    Smokeless tobacco: Never   Vaping Use    Vaping Use: Never used   Substance and Sexual Activity    Alcohol use: Yes     Comment: occasionally    Drug use: Never        REVIEW OF SYSTEMS:   GENERAL HEALTH: feels well otherwise  SKIN: denies any unusual skin lesions or rashes  RESPIRATORY: denies shortness of breath with exertion  CARDIOVASCULAR: denies chest pain on exertion  GI: denies abdominal pain and denies heartburn  NEURO: denies headaches    EXAM:   /62 (BP Location: Left arm, Patient Position: Sitting)   Temp 98.6 °F (37 °C)   Resp 18   Ht 5' 6\" (1.676 m)   Wt 180 lb (81.6 kg)   LMP 02/27/2024 (Approximate)   SpO2 98%   BMI 29.05 kg/m²   GENERAL: well developed, well nourished,in no apparent distress  SKIN: no rashes,no suspicious  lesions  HEENT: atraumatic, normocephalic,ears and throat are clear  NECK: supple,no adenopathy,no bruits  LUNGS: clear to auscultation  CARDIO: RRR without murmur  GI: good BS's,no masses, HSM or tenderness  EXTREMITIES: no cyanosis, clubbing or edema    ASSESSMENT AND PLAN:     Encounter Diagnosis   Name Primary?    Morbid obesity (HCC) Yes   Pt has been very successful in weight lose over the past 6 months, when we reach goal the idea is to keep weight off by making healthy lifestyle changes now. Increasing muscle with weight bearing exercise is essential.  Increased Ozempic to 2.4 mg.    No orders of the defined types were placed in this encounter.      Meds & Refills for this Visit:  Requested Prescriptions     Signed Prescriptions Disp Refills    semaglutide-weight management 2.4 MG/0.75ML Subcutaneous Solution Auto-injector 3 mL 0     Sig: Inject 0.75 mL (2.4 mg total) into the skin once a week for 4 doses.       Imaging & Consults:  None    Follow up as needed.    The patient indicates understanding of these issues and agrees to the plan.

## 2024-03-28 ENCOUNTER — PATIENT MESSAGE (OUTPATIENT)
Dept: FAMILY MEDICINE CLINIC | Facility: CLINIC | Age: 44
End: 2024-03-28

## 2024-03-28 DIAGNOSIS — E66.01 MORBID OBESITY (HCC): ICD-10-CM

## 2024-03-28 NOTE — TELEPHONE ENCOUNTER
From: Toya Leal  To: Sophia Coon  Sent: 3/28/2024 6:14 AM CDT  Subject: Prescription     Good Morning,  I got a call from [facility] that my RX was ready. They said it was $24.99 with no refills. So I am assuming it is one box. Normally I got 3 and by the looks of it I think that is what you ordered again. I just wanted to double check since I know insurance is picky about this drug. I just don’t want to not be able to get it due to an error on [facility] side. Did you place an order for 3 boxes or just the 1? And then I just reach out to you again in a month?    Thank you!

## 2024-04-18 ENCOUNTER — TELEPHONE (OUTPATIENT)
Dept: FAMILY MEDICINE CLINIC | Facility: CLINIC | Age: 44
End: 2024-04-18

## 2024-05-08 ENCOUNTER — MED REC SCAN ONLY (OUTPATIENT)
Dept: FAMILY MEDICINE CLINIC | Facility: CLINIC | Age: 44
End: 2024-05-08

## 2024-06-17 ENCOUNTER — TELEMEDICINE (OUTPATIENT)
Dept: TELEHEALTH | Age: 44
End: 2024-06-17
Payer: COMMERCIAL

## 2024-06-17 ENCOUNTER — HOSPITAL ENCOUNTER (OUTPATIENT)
Age: 44
Discharge: HOME | End: 2024-06-17

## 2024-06-17 VITALS
HEART RATE: 84 BPM | SYSTOLIC BLOOD PRESSURE: 126 MMHG | DIASTOLIC BLOOD PRESSURE: 80 MMHG | TEMPERATURE: 98 F | OXYGEN SATURATION: 98 % | BODY MASS INDEX: 25.71 KG/M2 | WEIGHT: 160 LBS | RESPIRATION RATE: 18 BRPM | HEIGHT: 66 IN

## 2024-06-17 DIAGNOSIS — M54.9 RIGHT-SIDED BACK PAIN, UNSPECIFIED BACK LOCATION, UNSPECIFIED CHRONICITY: ICD-10-CM

## 2024-06-17 DIAGNOSIS — R39.9 URINARY TRACT INFECTION SYMPTOMS: Primary | ICD-10-CM

## 2024-06-17 DIAGNOSIS — N30.00 ACUTE CYSTITIS WITHOUT HEMATURIA: Primary | ICD-10-CM

## 2024-06-17 LAB
B-HCG UR QL: NEGATIVE
BILIRUB UR QL STRIP: NEGATIVE
COLOR UR: YELLOW
GLUCOSE UR STRIP-MCNC: NEGATIVE MG/DL
KETONES UR STRIP-MCNC: 40 MG/DL
LEUKOCYTE ESTERASE UR QL STRIP: NEGATIVE
NITRITE UR QL STRIP: NEGATIVE
PH UR STRIP: 6 [PH]
PROT UR STRIP-MCNC: NEGATIVE MG/DL
SP GR UR STRIP: 1.01
UROBILINOGEN UR STRIP-ACNC: <2 MG/DL

## 2024-06-17 PROCEDURE — 99204 OFFICE O/P NEW MOD 45 MIN: CPT | Performed by: NURSE PRACTITIONER

## 2024-06-17 PROCEDURE — 81002 URINALYSIS NONAUTO W/O SCOPE: CPT | Performed by: NURSE PRACTITIONER

## 2024-06-17 PROCEDURE — 81025 URINE PREGNANCY TEST: CPT | Performed by: NURSE PRACTITIONER

## 2024-06-17 RX ORDER — CEPHALEXIN 500 MG/1
500 CAPSULE ORAL 2 TIMES DAILY
Qty: 14 CAPSULE | Refills: 0 | Status: SHIPPED | OUTPATIENT
Start: 2024-06-17 | End: 2024-06-24

## 2024-06-17 NOTE — ED INITIAL ASSESSMENT (HPI)
Patient has frequency of urination with a pelvic pressure. No fevers. She has had symptoms since this morning.

## 2024-06-17 NOTE — PROGRESS NOTES
Virtual/Telephone Check-In    Toya Leal verbally consents to a Virtual/Telephone Check-In service on 06/17/24.  Patient has been referred to the [facility] website at [email or web address] to review the yearly Consent to Treat document.  Patient understands and accepts financial responsibility for any deductible, co-insurance and/or co-pays associated with this service.       Telehealth Verbal Consent   I conducted a telehealth visit with Toya Leal today, 06/17/24, which was completed using two-way, real-time interactive audio and video communication. This has been done in good anahi to provide continuity of care in the best interest of the provider-patient relationship, due to the COVID - public health crisis/national emergency where restrictions of face-to-face office visits are ongoing. Every conscious effort was taken to allow for sufficient and adequate time to complete the visit.  The patient was made aware of the limitations of the telehealth visit, including treatment limitations as no physical exam could be performed.  The patient was advised to call 911 or to go to the ER in case there was an emergency.  The patient was also advised of the potential privacy & security concerns related to the telehealth platform.   The patient was made aware of where to find [facility]'s notice of privacy practices, telehealth consent form and other related consent forms and documents.  which are located on the [facility] website. The patient verbally agreed to telehealth consent form, related consents and the risks discussed.    Lastly, the patient confirmed that they were in [place].   Included in this visit, time may have been spent reviewing labs, medications, radiology tests and decision making. Appropriate medical decision-making and tests are ordered as detailed in the plan of care above.  Coding/billing information is submitted for this visit based on complexity of care and/or time spent for the visit.      CHIEF  COMPLAINT:   No chief complaint on file.      HPI:   Toya Leal is a 44 year old female who presents with symptoms of UTI. Complaining of urinary urgency for last 1-2 days. Symptoms have been worse since onset.  Patient has never had a UTI in the past. Patient denies dysuria and increased urinary frequency.  Associated symptoms: +Right back pain that started today.  No abdominal pain, fever, hematuria, nausea, or vomiting.   Current Outpatient Medications   Medication Sig Dispense Refill    Norgestimate-Eth Estradiol (SPRINTEC 28) 0.25-35 MG-MCG Oral Tab Take 1 tablet by mouth daily. 3 each 3      No past medical history on file.   Social History:  Social History     Socioeconomic History    Marital status: Single   Tobacco Use    Smoking status: Never    Smokeless tobacco: Never   Vaping Use    Vaping status: Never Used   Substance and Sexual Activity    Alcohol use: Yes     Comment: occasionally    Drug use: Never         REVIEW OF SYSTEMS:   GENERAL: Denies fever, chills, or body aches  SKIN: no rashes, no skin wounds or ulcers.  EYES:denies blurred vision or double vision  HEENT: no congestion, rhinorrhea, sore throat or ear pain  CARDIOVASCULAR: denies chest pain or palpitations  LUNGS: denies shortness of breath, cough, or wheezing  GI: See HPI. No N/V/C/D.   : See HPI.  NEURO: no headaches.    EXAM:   General: Alert  Respiratory:   Speaking in full sentences comfortably  Normal work of breathing  No cough during visit  Head: Normocephalic  Skin: No obvious rashes or lesions from what observed.     No results found for this or any previous visit (from the past 24 hour(s)).      ASSESSMENT AND PLAN:   Toya Leal is a 44 year old female presents with UTI symptoms.    ASSESSMENT:  Encounter Diagnoses   Name Primary?    Urinary tract infection symptoms Yes    Right-sided back pain, unspecified back location, unspecified chronicity        PLAN: Meds as listed below.  Comfort measures as described in  Patient Instructions.  -Patient with urinary urgency and new onset R back pain.  Patient has never had a UTI in the past.  [facility] advised for further evaluation.  Patient is agreeable to plan.      Meds & Refills for this Visit:  Requested Prescriptions      No prescriptions requested or ordered in this encounter       Risk and benefits of medication discussed. Stressed importance of completing full course of antibiotic unless told otherwise.     There are no Patient Instructions on file for this visit.      The patient indicates understanding of these issues and agrees to the plan.  The patient is asked to return in 3 days if not better. Call if fever, vomiting, worsening symptoms.    Toya Leal understands video visit evaluation is not a substitute for face-to-face examination or emergency care. Patient advised to go to ER or call 911 for worsening symptoms or acute distress.

## 2024-06-17 NOTE — ED PROVIDER NOTES
Patient Seen in: [facility]      History     Chief Complaint   Patient presents with    Urinary Symptoms     Stated Complaint: urinary issues    Subjective:   44-year-old female presents today with complaints of urinary frequency and dysuria that started late yesterday early this morning.  Denies any abdominal back pain.  No fever or chills.  No nausea or vomiting.  Patient is alert oriented x 3.  No other symptoms or concerns.  The patient's medication list, past medical history and social history elements as listed in today's nurse's notes were reviewed and agreed (except as otherwise stated in the HPI).  The patient's family history reviewed and determined to be noncontributory to the presenting problem            Objective:   Past Medical History:    Weight loss    on meds to help with weight loss recommened by PCP              Past Surgical History:   Procedure Laterality Date    Other surgical history      left knee arthoscopic                Social History     Socioeconomic History    Marital status: Single   Tobacco Use    Smoking status: Never    Smokeless tobacco: Never   Vaping Use    Vaping status: Never Used   Substance and Sexual Activity    Alcohol use: Yes     Comment: occasionally    Drug use: Never              Review of Systems    Positive for stated complaint: urinary issues  Other systems are as noted in HPI.  Constitutional and vital signs reviewed.      All other systems reviewed and negative except as noted above.    Physical Exam     ED Triage Vitals [06/17/24 1050]   /80   Pulse 84   Resp 18   Temp 98.1 °F (36.7 °C)   Temp src Temporal   SpO2 98 %   O2 Device None (Room air)       Current Vitals:   Vital Signs  BP: 126/80  Pulse: 84  Resp: 18  Temp: 98.1 °F (36.7 °C)  Temp src: Temporal    Oxygen Therapy  SpO2: 98 %  O2 Device: None (Room air)            Physical Exam  Vitals and nursing note reviewed.   Constitutional:       Appearance: Normal appearance.   HENT:       Head: Normocephalic.      Mouth/Throat:      Mouth: Mucous membranes are moist.   Cardiovascular:      Rate and Rhythm: Normal rate.   Abdominal:      Tenderness: There is no abdominal tenderness. There is no right CVA tenderness, left CVA tenderness or guarding.   Skin:     General: Skin is warm and dry.   Neurological:      Mental Status: She is alert and oriented to person, place, and time.               ED Course     Labs Reviewed   [facility] POCT URINALYSIS DIPSTICK - Abnormal; Notable for the following components:       Result Value    Urine Clarity Turbid (*)     Ketone, Urine 40 (*)     Blood, Urine Large (*)     All other components within normal limits   POCT PREGNANCY URINE - Normal   URINE CULTURE, ROUTINE                      MDM     Please note that this report has been produced using speech recognition software and may contain errors related to that system including, but not limited to, errors in grammar, punctuation, and spelling, as well as words and phrases that possibly may have been recognized inappropriately.  If there are any questions or concerns, contact the dictating provider for clarification.        Note to patient: The 21st Century Cures Act makes medical notes like these available to patients in the interest of transparency. However, this is a medical document intended as peer to peer communication. It is written in medical language and may contain abbreviations or verbiage that are unfamiliar. It may appear blunt or direct. Medical documents are intended to carry relevant information, facts as evident, and the clinical opinion of the practitioner.                                   Medical Decision Making  Differential diagnosis includes but is not limited to: Cystitis, urethritis, pyelonephritis, kidney stone      Presents to complaints of urinary symptoms that started late last night early this morning.  No nausea vomiting no flank pain no abdominal pain.  Urine dip does show large blood no  nitrates or leukocytes.  Will treat for possible urinary tract infection.  Patient given prescription for Keflex to take as directed.  Urine culture be sent to lab and is pending.  Patient follow-up with primary care physician 1 week for reevaluation.  Go directly to the ER with any sudden onset of flank pain, abdominal pain, fever, vomiting or any worsening of symptoms.  Patient verbalized understanding and agreed plan of care.    Amount and/or Complexity of Data Reviewed  Labs: ordered. Decision-making details documented in ED Course.     Details: Urine dip  Urine culture    Risk  OTC drugs.  Prescription drug management.        Disposition and Plan     Clinical Impression:  1. Acute cystitis without hematuria         Disposition:  Discharge  6/17/2024 11:16 am    Follow-up:  Sophia Coon MD  [address] [facility] [address]  [phone]    In 1 week  for recheck          Medications Prescribed:  Current Discharge Medication List        START taking these medications    Details   cephalexin 500 MG Oral Cap Take 1 capsule (500 mg total) by mouth 2 (two) times daily for 7 days.  Qty: 14 capsule, Refills: 0

## 2024-07-10 ENCOUNTER — OFFICE VISIT (OUTPATIENT)
Dept: FAMILY MEDICINE CLINIC | Facility: CLINIC | Age: 44
End: 2024-07-10
Payer: COMMERCIAL

## 2024-07-10 VITALS
HEIGHT: 66 IN | DIASTOLIC BLOOD PRESSURE: 64 MMHG | RESPIRATION RATE: 16 BRPM | TEMPERATURE: 98 F | SYSTOLIC BLOOD PRESSURE: 110 MMHG | HEART RATE: 60 BPM | BODY MASS INDEX: 26.52 KG/M2 | WEIGHT: 165 LBS | OXYGEN SATURATION: 100 %

## 2024-07-10 DIAGNOSIS — Z00.00 WELL ADULT EXAM: Primary | ICD-10-CM

## 2024-07-10 DIAGNOSIS — Z12.31 SCREENING MAMMOGRAM, ENCOUNTER FOR: ICD-10-CM

## 2024-07-10 PROCEDURE — 99396 PREV VISIT EST AGE 40-64: CPT | Performed by: INTERNAL MEDICINE

## 2024-07-10 PROCEDURE — 88175 CYTOPATH C/V AUTO FLUID REDO: CPT | Performed by: INTERNAL MEDICINE

## 2024-07-10 PROCEDURE — 87624 HPV HI-RISK TYP POOLED RSLT: CPT | Performed by: INTERNAL MEDICINE

## 2024-07-10 NOTE — PROGRESS NOTES
REASON FOR VISIT:    Toya Leal is a 44 year old female who presents for an Annual Health Assessment.    Pt is feeling 'OK\".  Was at [facility] for right flank pain and no UTI. But she describes pain like it may be a kidney stone.      There is no problem list on file for this patient.    General Health     At any time do you feel concerned for the safety/well-being of yourself and/or your children, in your home or elsewhere?: No     CAGE:           Depression Screening (PHQ-2/PHQ-9):  Over the LAST 2 WEEKS   Little interest or pleasure in doing things (over the last two weeks)?: Not at all  Feeling down, depressed, or hopeless (over the last two weeks)?: Not at all  PHQ-2 SCORE: 0         PREVENTATIVE SERVICES  INDICATIONS AND SCHEDULE Recommendation Internal Lab or Procedure   Colonoscopy Screen Every 10 years No recommendations at this time   Flex Sigmoidoscopy Screen  Every 5 years No results found for this or any previous visit.   Fecal Occult Blood  Annually No results found for: \"FOB\", \"OCCULTSTOOL\"   Obesity Screening Screen all adults annually Body mass index is 26.63 kg/m².     Preventive Services for Which Recommendations Vary with Risk Recommendation Internal Lab or Procedure   Cholesterol Screening Recommended screening varies with age, risk and gender LDL-CHOLESTEROL (mg/dL (calc))   Date Value   05/13/2023 84      Diabetes Screening  If history of high blood pressure or other  risk factors HgbA1C (%)   Date Value   09/08/2020 4.8     GLUCOSE (mg/dL)   Date Value   05/13/2023 90        Gonorrhea Screening If high risk No results found for: \"GONOCOCCUS\"   HIV Screening For all adults age 18-65, older adults at increased risk No results found for: \"HIV\"   Syphilis Screening Screen if pregnant or high risk No results found for: \"RPR\"   Hepatitis C Screening Screen pts at high risk plus screen one time for adults born 1945 - 1965 No results found for: \"HCVAB\"   Tuberculosis Screen If high risk No  components found for: \"PPDINDURAT\"       SPECIFIC DISEASE MONITORING Internal Lab or Procedure   No disease specific diagnoses    ALLERGIES:   No Known Allergies  CURRENT MEDICATIONS:   Current Outpatient Medications   Medication Sig Dispense Refill    semaglutide-weight management 2.4 MG/0.75ML Subcutaneous Solution Auto-injector Inject 0.75 mL (2.4 mg total) into the skin once a week. 4 each 0    Norgestimate-Eth Estradiol (SPRINTEC 28) 0.25-35 MG-MCG Oral Tab Take 1 tablet by mouth daily. (Patient not taking: Reported on 6/17/2024) 3 each 3      MEDICAL INFORMATION:   Past Medical History:    Weight loss    on meds to help with weight loss recommened by PCP      Past Surgical History:   Procedure Laterality Date    Other surgical history      left knee arthoscopic      Family History   Problem Relation Age of Onset    Diabetes Father     Hypertension Father     Other (Other) Father     Breast Cancer Paternal Aunt     Breast Cancer Paternal Aunt       SOCIAL HISTORY:   Social History     Socioeconomic History    Marital status: Single   Tobacco Use    Smoking status: Never    Smokeless tobacco: Never   Vaping Use    Vaping status: Never Used   Substance and Sexual Activity    Alcohol use: Yes     Comment: occasionally    Drug use: Never     Occ: fulltime : no      REVIEW OF SYSTEMS:   GENERAL: feels well otherwise  SKIN: denies any unusual skin lesions  EYES: denies blurred vision or double vision  HEENT: denies nasal congestion, sinus pain or ST  LUNGS: denies shortness of breath with exertion  CARDIOVASCULAR: denies chest pain on exertion  GI: denies abdominal pain, denies heartburn  : denies dysuria, vaginal discharge or itching, periods regular   MUSCULOSKELETAL: denies back pain  NEURO: denies headaches  PSYCHE: denies depression or anxiety  HEMATOLOGIC: denies hx of anemia  ENDOCRINE: denies thyroid history  ALL/ASTHMA: denies hx of allergy or asthma    EXAM:   /64   Pulse 60   Temp 98 °F  (36.7 °C) (Temporal)   Resp 16   Ht 5' 6\" (1.676 m)   Wt 165 lb (74.8 kg)   LMP 06/18/2024 (Approximate)   SpO2 100%   BMI 26.63 kg/m²   >   BP Readings from Last 3 Encounters:   07/10/24 110/64   06/17/24 126/80   03/26/24 110/62     Patient's last menstrual period was 06/18/2024 (approximate).  GENERAL: well developed, well nourished, in no apparent distress   SKIN: no rashes, no suspicious lesions  HEENT: atraumatic, normocephalic, ears and throat are clear  EYES:PERRLA, EOMI, conjunctiva are clear.   NECK: supple, no adenopathy, no bruits  CHEST: no chest tenderness  BREAST: no masses, no discharge, no axillary lymphadenopathy   LUNGS: clear to auscultation  CARDIO: RRR without murmur  GI: good BS's, no masses, HSM or tenderness  :introitus is normal, scant discharge, cervix is pink and non friable, no adnexal masses or tenderness. Pap performed  RECTAL: deferred  MUSCULOSKELETAL: back is not tender, FROM of the back  EXTREMITIES: no cyanosis, clubbing or edema  NEURO: Oriented times three, cranial nerves are intact, motor and sensory are grossly intact      ASSESSMENT AND OTHER RELEVANT CHRONIC CONDITIONS:   Toya Leal is a 44 year old female who presents for an Annual Health Assessment.     PLAN SUMMARY:   Diagnoses and all orders for this visit:    Well adult exam    Screening mammogram, encounter for  -     [facility] ALEKSEY 2D+3D SCREENING BILAT (CPT=77067/52951); Future    Other orders  -     ThinPrep PAP with HPV Reflex Request; Future  -     semaglutide-weight management 2.4 MG/0.75ML Subcutaneous Solution Auto-injector; Inject 0.75 mL (2.4 mg total) into the skin once a week.     Renewed Ozempic and needs to reduce slowly over time.   The patient indicates understanding of these issues and agrees to the plan.    Diet counseling perfomed  Exercise counseling perfomed    SUGGESTED VACCINATIONS - Influenza, Pneumococcal, Zoster, Tetanus, HPV   Influenza: No recommendations at this time  Pneumonia:  No recommendations at this time  HPV: No recommendations at this time  Tdap: DTaP,Tdap,and Td Vaccines(2 - Tdap) due on 02/23/2016  Shingles:      Influenza Annually   Pneumococcal if high risk   Td/Tdap once then every 10 years   HPV Males 11-21   Zoster (Shingles) 60 and older: one dose   Varicella 2 doses if not immune   MMR 1-2 doses if born after 1955 and not immune

## 2024-07-16 ENCOUNTER — TELEPHONE (OUTPATIENT)
Dept: FAMILY MEDICINE CLINIC | Facility: CLINIC | Age: 44
End: 2024-07-16

## 2024-07-16 LAB
.: NORMAL
.: NORMAL

## 2024-07-16 NOTE — TELEPHONE ENCOUNTER
Per Collin the [employer] requires a prior authorization.  Call placed to [employer] [phone], per Della at [employer] patient needs to be enrolled with the [employer] program for 30 days before a prior authorization can be initiated. Patient can go to [employer]. Patient advised she should call her insurance company to see if there has been any change with her coverage.

## 2024-07-22 ENCOUNTER — PATIENT MESSAGE (OUTPATIENT)
Dept: FAMILY MEDICINE CLINIC | Facility: CLINIC | Age: 44
End: 2024-07-22

## 2024-07-22 LAB — HPV E6+E7 MRNA CVX QL NAA+PROBE: NEGATIVE

## 2024-07-23 NOTE — TELEPHONE ENCOUNTER
From: Toya Leal  To: Sophia Coon  Sent: 7/22/2024 7:57 PM CDT  Subject: [employer]    Hello,     My insurance has changed and it seems like the medication will not be covered. I am still waiting on the final call from [employer] but just curious if there is any other route I can take that you know of. Will I have any side effects by just stopping this medication? I am also reaching back out advising I’ve been on this for over a year to see if they will reconsider but I just want to know they to expect on all avenues.     Thank you

## 2024-08-08 ENCOUNTER — HOSPITAL ENCOUNTER (OUTPATIENT)
Dept: MAMMOGRAPHY | Age: 44
Discharge: HOME | End: 2024-08-08
Attending: INTERNAL MEDICINE
Payer: COMMERCIAL

## 2024-08-08 DIAGNOSIS — Z12.31 SCREENING MAMMOGRAM, ENCOUNTER FOR: ICD-10-CM

## 2024-08-08 PROCEDURE — 77067 SCR MAMMO BI INCL CAD: CPT | Performed by: INTERNAL MEDICINE

## 2024-08-08 PROCEDURE — 77063 BREAST TOMOSYNTHESIS BI: CPT | Performed by: INTERNAL MEDICINE

## 2024-09-13 ENCOUNTER — HOSPITAL ENCOUNTER (OUTPATIENT)
Dept: ULTRASOUND IMAGING | Age: 44
Discharge: HOME | End: 2024-09-13
Attending: INTERNAL MEDICINE
Payer: COMMERCIAL

## 2024-09-13 DIAGNOSIS — R92.2 INCONCLUSIVE MAMMOGRAM: ICD-10-CM

## 2024-09-13 PROCEDURE — 76642 ULTRASOUND BREAST LIMITED: CPT | Performed by: INTERNAL MEDICINE

## 2025-04-15 ENCOUNTER — OFFICE VISIT (OUTPATIENT)
Dept: FAMILY MEDICINE CLINIC | Facility: CLINIC | Age: 45
End: 2025-04-15
Payer: COMMERCIAL

## 2025-04-15 VITALS
TEMPERATURE: 98 F | HEART RATE: 64 BPM | RESPIRATION RATE: 16 BRPM | WEIGHT: 202.38 LBS | DIASTOLIC BLOOD PRESSURE: 68 MMHG | SYSTOLIC BLOOD PRESSURE: 114 MMHG | OXYGEN SATURATION: 100 % | BODY MASS INDEX: 33 KG/M2

## 2025-04-15 DIAGNOSIS — Z00.00 WELL ADULT EXAM: Primary | ICD-10-CM

## 2025-04-15 DIAGNOSIS — N92.6 IRREGULAR PERIODS: ICD-10-CM

## 2025-04-15 PROCEDURE — 99396 PREV VISIT EST AGE 40-64: CPT | Performed by: INTERNAL MEDICINE

## 2025-04-15 NOTE — PROGRESS NOTES
Toya Leal is a 45 year old female.  HPI:   Work is stressful.  New position, a lot of new information and learning. She cannot sleep up every night at 3 am.    Current Medications[1]   Past Medical History[2]   Social History:  Short Social Hx on File[3]     REVIEW OF SYSTEMS:   GENERAL HEALTH: feels well otherwise  SKIN: denies any unusual skin lesions or rashes  RESPIRATORY: denies shortness of breath with exertion  CARDIOVASCULAR: denies chest pain on exertion  GI: denies abdominal pain and denies heartburn  NEURO: denies headaches    EXAM:   /68   Pulse 64   Temp 98.2 °F (36.8 °C) (Temporal)   Resp 16   Wt 202 lb 6 oz (91.8 kg)   LMP 02/28/2025 (Approximate)   SpO2 100%   BMI 32.66 kg/m²   GENERAL: well developed, well nourished,in no apparent distress  SKIN: no rashes,no suspicious lesions  HEENT: atraumatic, normocephalic,ears and throat are clear  NECK: supple,no adenopathy,no bruits  LUNGS: clear to auscultation  CARDIO: RRR without murmur  GI: good BS's,no masses, HSM or tenderness  EXTREMITIES: no cyanosis, clubbing or edema    ASSESSMENT AND PLAN:     Encounter Diagnoses   Name Primary?    Well adult exam Yes    Irregular periods        Orders Placed This Encounter   Procedures    TSH and Free T4 [E]    Lipid Panel [E]    CBC W Differential W Platelet [E]    Comp Metabolic Panel (14) [E]    Thyroid Peroxidase (TPO) AB [E]       Meds & Refills for this Visit:  Requested Prescriptions      No prescriptions requested or ordered in this encounter       Imaging & Consults:  None    Follow up as needed.    The patient indicates understanding of these issues and agrees to the plan.         [1]   Current Outpatient Medications   Medication Sig Dispense Refill    Norgestimate-Eth Estradiol (SPRINTEC 28) 0.25-35 MG-MCG Oral Tab Take 1 tablet by mouth daily. (Patient not taking: Reported on 6/17/2024) 3 each 3   [2]   Past Medical History:   Weight loss    on meds to help with weight loss  recommened by PCP   [3]   Social History  Socioeconomic History    Marital status: Single   Tobacco Use    Smoking status: Never    Smokeless tobacco: Never   Vaping Use    Vaping status: Never Used   Substance and Sexual Activity    Alcohol use: Yes     Comment: occasionally    Drug use: Never

## 2025-04-18 LAB
ABSOLUTE BASOPHILS: 54 CELLS/UL (ref 0–200)
ABSOLUTE EOSINOPHILS: 241 CELLS/UL (ref 15–500)
ABSOLUTE LYMPHOCYTES: 1869 CELLS/UL (ref 850–3900)
ABSOLUTE MONOCYTES: 523 CELLS/UL (ref 200–950)
ABSOLUTE NEUTROPHILS: 4013 CELLS/UL (ref 1500–7800)
ALBUMIN/GLOBULIN RATIO: 1.8 (CALC) (ref 1–2.5)
ALBUMIN: 4.7 G/DL (ref 3.6–5.1)
ALKALINE PHOSPHATASE: 59 U/L (ref 31–125)
ALT: 14 U/L (ref 6–29)
AST: 14 U/L (ref 10–30)
BASOPHILS: 0.8 %
BILIRUBIN, TOTAL: 0.7 MG/DL (ref 0.2–1.2)
BUN: 20 MG/DL (ref 7–25)
CALCIUM: 9.5 MG/DL (ref 8.6–10.2)
CARBON DIOXIDE: 28 MMOL/L (ref 20–32)
CHLORIDE: 101 MMOL/L (ref 98–110)
CHOL/HDLC RATIO: 1.8 (CALC)
CHOLESTEROL, TOTAL: 186 MG/DL
CREATININE: 0.84 MG/DL (ref 0.5–0.99)
EGFR: 88 ML/MIN/1.73M2
EOSINOPHILS: 3.6 %
GLOBULIN: 2.6 G/DL (CALC) (ref 1.9–3.7)
GLUCOSE: 86 MG/DL (ref 65–99)
HDL CHOLESTEROL: 102 MG/DL
HEMATOCRIT: 48.6 % (ref 35–45)
HEMOGLOBIN: 15 G/DL (ref 11.7–15.5)
LDL-CHOLESTEROL: 69 MG/DL (CALC)
LYMPHOCYTES: 27.9 %
MCH: 28.7 PG (ref 27–33)
MCHC: 30.9 G/DL (ref 32–36)
MCV: 92.9 FL (ref 80–100)
MONOCYTES: 7.8 %
MPV: 12.8 FL (ref 7.5–12.5)
NEUTROPHILS: 59.9 %
NON-HDL CHOLESTEROL: 84 MG/DL (CALC)
PLATELET COUNT: 256 THOUSAND/UL (ref 140–400)
POTASSIUM: 4.8 MMOL/L (ref 3.5–5.3)
PROTEIN, TOTAL: 7.3 G/DL (ref 6.1–8.1)
RDW: 12.9 % (ref 11–15)
RED BLOOD CELL COUNT: 5.23 MILLION/UL (ref 3.8–5.1)
SODIUM: 138 MMOL/L (ref 135–146)
T4, FREE: 1.1 NG/DL (ref 0.8–1.8)
THYROID PEROXIDASE$ANTIBODIES: 1 IU/ML
TRIGLYCERIDES: 73 MG/DL
TSH: 1.38 MIU/L
WHITE BLOOD CELL COUNT: 6.7 THOUSAND/UL (ref 3.8–10.8)

## 2025-04-20 ENCOUNTER — PATIENT MESSAGE (OUTPATIENT)
Dept: FAMILY MEDICINE CLINIC | Facility: CLINIC | Age: 45
End: 2025-04-20

## 2025-04-21 ENCOUNTER — MED REC SCAN ONLY (OUTPATIENT)
Dept: FAMILY MEDICINE CLINIC | Facility: CLINIC | Age: 45
End: 2025-04-21

## 2025-04-21 RX ORDER — BUPROPION HYDROCHLORIDE 75 MG/1
75 TABLET ORAL 2 TIMES DAILY
Qty: 60 TABLET | Refills: 0 | Status: SHIPPED | OUTPATIENT
Start: 2025-04-21 | End: 2025-05-21

## 2025-04-21 NOTE — TELEPHONE ENCOUNTER
Virtual Telephone Check-In    Toya Leal verbally {consents to/declines (Can be done by front staff):5280} a Virtual/Telephone Check-In visit on 04/21/25.  Patient has been referred to the [facility] website at [email or web address] to review the yearly Consent to Treat document.    Patient understands and accepts financial responsibility for any deductible, co-insurance and/or co-pays associated with this service.    Duration of the service: *** minutes      Summary of topics discussed:       {Assessment and Plan Smarlist and follow up recs (Optional):2206}      Sophia Coon MD

## 2025-05-22 ENCOUNTER — PATIENT MESSAGE (OUTPATIENT)
Dept: FAMILY MEDICINE CLINIC | Facility: CLINIC | Age: 45
End: 2025-05-22

## 2025-05-23 RX ORDER — BUPROPION HYDROCHLORIDE 75 MG/1
75 TABLET ORAL 2 TIMES DAILY
Qty: 60 TABLET | Refills: 0 | Status: SHIPPED | OUTPATIENT
Start: 2025-05-23 | End: 2025-06-22

## 2025-05-23 NOTE — TELEPHONE ENCOUNTER
See My Chart message      LOV  4-15-25    LAST LAB  4-17-25    LAST RX  4-22-25  #[id number]    Next OV    Future Appointments   Date Time Provider Department Center   5/28/2025  1:15 PM Sophia Coon MD [facility] [place]     PROTOCOL    Psychiatric Non-Scheduled (Anti-Anxiety) [id number]/23/2025 08:54 AM   Protocol Details In person appointment or virtual visit in the past 6 mos or appointment in next 3 mos    Depression Screening completed within the past 12 months    Medication is active on med list          Refilled per protocol

## 2025-05-28 ENCOUNTER — TELEMEDICINE (OUTPATIENT)
Dept: FAMILY MEDICINE CLINIC | Facility: CLINIC | Age: 45
End: 2025-05-28
Payer: COMMERCIAL

## 2025-05-28 DIAGNOSIS — E66.01 MORBID OBESITY (HCC): Primary | ICD-10-CM

## 2025-06-23 NOTE — PROGRESS NOTES
Toya Leal is a 45 year old female.  HPI:   Pt has weight check and feeling good. She cannot afford the meds.   Current Medications[1]   Past Medical History[2]   Social History:  Short Social Hx on File[3]     REVIEW OF SYSTEMS:   GENERAL HEALTH: feels well otherwise  SKIN: denies any unusual skin lesions or rashes  RESPIRATORY: denies shortness of breath with exertion  CARDIOVASCULAR: denies chest pain on exertion  GI: denies abdominal pain and denies heartburn  NEURO: denies headaches    EXAM:   LMP 02/28/2025 (Approximate)   GENERAL: well developed, well nourished,in no apparent distress    ASSESSMENT AND PLAN:     Encounter Diagnosis   Name Primary?    Morbid obesity (HCC) Yes   Weaning meds and needs to continue lifestyle changes.     No orders of the defined types were placed in this encounter.      Meds & Refills for this Visit:  Requested Prescriptions      No prescriptions requested or ordered in this encounter       Imaging & Consults:  None    Follow up as needed.    The patient indicates understanding of these issues and agrees to the plan.       [1]   Current Outpatient Medications   Medication Sig Dispense Refill    buPROPion  MG Oral Tablet 24 Hr Take 1 tablet (150 mg total) by mouth daily. 90 tablet 2    Norgestimate-Eth Estradiol (SPRINTEC 28) 0.25-35 MG-MCG Oral Tab Take 1 tablet by mouth daily. (Patient not taking: Reported on 6/17/2024) 3 each 3   [2]   Past Medical History:   Weight loss    on meds to help with weight loss recommened by PCP   [3]   Social History  Socioeconomic History    Marital status: Single   Tobacco Use    Smoking status: Never    Smokeless tobacco: Never   Vaping Use    Vaping status: Never Used   Substance and Sexual Activity    Alcohol use: Yes     Comment: occasionally    Drug use: Never